# Patient Record
Sex: FEMALE
[De-identification: names, ages, dates, MRNs, and addresses within clinical notes are randomized per-mention and may not be internally consistent; named-entity substitution may affect disease eponyms.]

---

## 2020-11-04 ENCOUNTER — APPOINTMENT (OUTPATIENT)
Dept: PULMONOLOGY | Facility: CLINIC | Age: 60
End: 2020-11-04
Payer: COMMERCIAL

## 2020-11-04 VITALS
HEART RATE: 78 BPM | OXYGEN SATURATION: 98 % | WEIGHT: 228 LBS | TEMPERATURE: 97.1 F | DIASTOLIC BLOOD PRESSURE: 90 MMHG | SYSTOLIC BLOOD PRESSURE: 130 MMHG | RESPIRATION RATE: 12 BRPM | BODY MASS INDEX: 36.21 KG/M2 | HEIGHT: 66.5 IN

## 2020-11-04 DIAGNOSIS — E66.9 OBESITY, UNSPECIFIED: ICD-10-CM

## 2020-11-04 DIAGNOSIS — R06.81 APNEA, NOT ELSEWHERE CLASSIFIED: ICD-10-CM

## 2020-11-04 DIAGNOSIS — R06.83 SNORING: ICD-10-CM

## 2020-11-04 DIAGNOSIS — K21.9 GASTRO-ESOPHAGEAL REFLUX DISEASE W/OUT ESOPHAGITIS: ICD-10-CM

## 2020-11-04 DIAGNOSIS — Z78.9 OTHER SPECIFIED HEALTH STATUS: ICD-10-CM

## 2020-11-04 DIAGNOSIS — Z82.49 FAMILY HISTORY OF ISCHEMIC HEART DISEASE AND OTHER DISEASES OF THE CIRCULATORY SYSTEM: ICD-10-CM

## 2020-11-04 DIAGNOSIS — E03.9 HYPOTHYROIDISM, UNSPECIFIED: ICD-10-CM

## 2020-11-04 DIAGNOSIS — Z83.49 FAMILY HISTORY OF OTHER ENDOCRINE, NUTRITIONAL AND METABOLIC DISEASES: ICD-10-CM

## 2020-11-04 DIAGNOSIS — F17.200 NICOTINE DEPENDENCE, UNSPECIFIED, UNCOMPLICATED: ICD-10-CM

## 2020-11-04 PROCEDURE — 99204 OFFICE O/P NEW MOD 45 MIN: CPT | Mod: 25

## 2020-11-04 PROCEDURE — 99072 ADDL SUPL MATRL&STAF TM PHE: CPT

## 2020-11-04 PROCEDURE — 99406 BEHAV CHNG SMOKING 3-10 MIN: CPT

## 2020-11-04 RX ORDER — LEVOTHYROXINE SODIUM 137 UG/1
TABLET ORAL
Refills: 0 | Status: ACTIVE | COMMUNITY

## 2020-11-04 RX ORDER — HYDROCHLOROTHIAZIDE 12.5 MG/1
TABLET ORAL
Refills: 0 | Status: ACTIVE | COMMUNITY

## 2020-11-04 NOTE — ASSESSMENT
[FreeTextEntry1] : snoring, BMI 36,witnessed apnea, weight gain\par \par obstructive sleep apnea likely, will evaluate with unattended home sleep testing.  Treatment options for sleep disordered breathing were discussed.  The most rapid and successful treatment remains nasal CPAP or BilevelPAP.  Alternatives include upper airway surgery such as uvulopharyngoplasty or a dental appliance (better for milder cases).  Recently hypoglossal nerve stimulation has been used.  Positional therapy (avoidance of supine posture) can be helpful, and all patients should try to maintain a healthy weight and avoid alcohol or other sedating medications close to bedtime \par \par smoker, 1/4 pack/day, no respiratory sx.  Smoking cessation discussed   with patient.  Options include nicotine replacement with patch, gum or lozenge or use of medication to reduce cravings (Chantix). Smoking cessation programs may be useful.

## 2020-11-04 NOTE — PHYSICAL EXAM
[General Appearance - Well Developed] : well developed [General Appearance - In No Acute Distress] : no acute distress [Normal Oropharynx] : normal oropharynx [IV] : IV [Apical Impulse] : the apical impulse was normal [Heart Rate And Rhythm] : heart rate was normal and rhythm regular [Heart Sounds] : normal S1 and S2 [Heart Sounds Gallop] : no gallops [Respiration, Rhythm And Depth] : normal respiratory rhythm and effort [Exaggerated Use Of Accessory Muscles For Inspiration] : no accessory muscle use [Auscultation Breath Sounds / Voice Sounds] : lungs were clear to auscultation bilaterally [Abnormal Walk] : normal gait [Involuntary Movements] : no involuntary movements were seen [Musculoskeletal - Swelling] : no joint swelling seen [Nail Clubbing] : no clubbing of the fingernails [Cyanosis, Localized] : no localized cyanosis [Skin Color & Pigmentation] : normal skin color and pigmentation [] : no rash [Skin Lesions] : no skin lesions [No Focal Deficits] : no focal deficits [Oriented To Time, Place, And Person] : oriented to person, place, and time [Affect] : the affect was normal [Mood] : the mood was normal [FreeTextEntry1] : large neck

## 2020-11-04 NOTE — HISTORY OF PRESENT ILLNESS
-Most likely due to hemodilution, but infectious gastroenteritis should be considered.   -Follow up procalcitonin level.   -Monitor.    [FreeTextEntry1] : 11/04/2020 :  MARII EDWARD is a 60 year current smoker female with PMHx HTN, hypothyroid, who is here for evaluation of snoring and apnea. \par She states that her partner always wakes her up in middle of the night when  she experiences apnea. She reports apnea for the past year. She wakes up with dry mouth and morning HA. She often is dozing off during her  work as an  many times and feels tired during the day.  \par She has gained about 30 lb over the past 6 months. \par \par Sleep Routine:\par \par She goes to bed at 10P, sleep latency is about 5 min, she wakes up 3x/night, WASO is about 5 min, and then he is up at 5A. She does not nap . His ESS is 13/24.\par \par \par She smokes 5 cigarettes/day x 20 years.  but denies cataplexy, Denies  parasomnia, restless legs, leg movements, cataplexy or sleep paralysis. \par \par

## 2020-11-04 NOTE — REVIEW OF SYSTEMS
[EDS: ESS=____] : daytime somnolence: ESS=[unfilled] [Fatigue] : fatigue [Recent Wt Gain (___ Lbs)] : recent [unfilled] ~Ulb weight gain [Snoring] : snoring [Witnessed Apneas] : witnessed apnea [A.M. Dry Mouth] : a.m. dry mouth [Obesity] : obesity [Thyroid Disease] : thyroid disease [Diabetes] : diabetes  [A.M. Headache] : headache present upon awakening [Heartburn] : heartburn [Nocturia] : nocturia [Negative] : Psychiatric [Leg Dysesthesias] : no leg dysesthesias [Sleep Paralysis] : no sleep paralysis [Cataplexy] :  no cataplexy [Hypnogogic Hallucinations] : no hypnogogic hallucinations

## 2020-11-17 ENCOUNTER — APPOINTMENT (OUTPATIENT)
Dept: SLEEP CENTER | Facility: HOME HEALTH | Age: 60
End: 2020-11-17
Payer: COMMERCIAL

## 2020-11-17 ENCOUNTER — OUTPATIENT (OUTPATIENT)
Dept: OUTPATIENT SERVICES | Facility: HOSPITAL | Age: 60
LOS: 1 days | End: 2020-11-17
Payer: COMMERCIAL

## 2020-11-17 PROCEDURE — 95800 SLP STDY UNATTENDED: CPT | Mod: 26

## 2020-11-17 PROCEDURE — 95800 SLP STDY UNATTENDED: CPT

## 2020-11-18 DIAGNOSIS — G47.33 OBSTRUCTIVE SLEEP APNEA (ADULT) (PEDIATRIC): ICD-10-CM

## 2020-11-29 ENCOUNTER — FORM ENCOUNTER (OUTPATIENT)
Age: 60
End: 2020-11-29

## 2020-11-30 ENCOUNTER — APPOINTMENT (OUTPATIENT)
Dept: PULMONOLOGY | Facility: CLINIC | Age: 60
End: 2020-11-30
Payer: COMMERCIAL

## 2020-11-30 VITALS
HEART RATE: 50 BPM | HEIGHT: 66.5 IN | OXYGEN SATURATION: 99 % | WEIGHT: 229 LBS | DIASTOLIC BLOOD PRESSURE: 85 MMHG | SYSTOLIC BLOOD PRESSURE: 146 MMHG | BODY MASS INDEX: 36.37 KG/M2 | TEMPERATURE: 97.7 F

## 2020-11-30 PROCEDURE — 99072 ADDL SUPL MATRL&STAF TM PHE: CPT

## 2020-11-30 PROCEDURE — 99214 OFFICE O/P EST MOD 30 MIN: CPT

## 2020-11-30 NOTE — ASSESSMENT
[FreeTextEntry1] : obstructive sleep apnea, moderate \par \par Treatment options for sleep disordered breathing were discussed.  The most rapid and successful treatment remains nasal CPAP or BilevelPAP.  Alternatives include upper airway surgery such as uvulopharyngoplasty or a dental appliance (better for milder cases).  Recently hypoglossal nerve stimulation has been used.  Positional therapy (avoidance of supine posture) can be helpful, and all patients should try to maintain a healthy weight and avoid alcohol or other sedating medications close to bedtime \par \par Treatment will be ordered with autotitrating CPAP, which will be downloaded after a few weeks of use to check compliance and optimize pressure based on efficacy.  If not comfortable with this treatment, polysomnography with CPAP titration may be needed.

## 2020-11-30 NOTE — PROCEDURE
[FreeTextEntry1] : unattended home sleep testing done 11/17/2020 reviewed w patient:  moderate obstructive sleep apnea  with Apnea Hypopnea Index = 25, no oxygen desaturations   below 89%

## 2020-11-30 NOTE — HISTORY OF PRESENT ILLNESS
[FreeTextEntry1] : 11/30/2020 :  MARII EDWARD is a 60 year female who is here after having unattended home sleep testing.  Snoring, witnessed apnea, and excessive daytime somnolence with [place] sleepiness scale (out of 24 points) = 13.\par \par There have been no significant medical events and no new medications since the patient was last seen.

## 2021-06-29 ENCOUNTER — APPOINTMENT (OUTPATIENT)
Dept: SURGERY | Facility: CLINIC | Age: 61
End: 2021-06-29
Payer: COMMERCIAL

## 2021-06-29 VITALS — WEIGHT: 230 LBS | BODY MASS INDEX: 36.53 KG/M2 | HEIGHT: 66.6 IN

## 2021-06-29 DIAGNOSIS — Z00.00 ENCOUNTER FOR GENERAL ADULT MEDICAL EXAMINATION W/OUT ABNORMAL FINDINGS: ICD-10-CM

## 2021-06-29 DIAGNOSIS — I10 ESSENTIAL (PRIMARY) HYPERTENSION: ICD-10-CM

## 2021-06-29 DIAGNOSIS — G47.33 OBSTRUCTIVE SLEEP APNEA (ADULT) (PEDIATRIC): ICD-10-CM

## 2021-06-29 PROCEDURE — 99204 OFFICE O/P NEW MOD 45 MIN: CPT | Mod: 95

## 2021-06-29 NOTE — HISTORY OF PRESENT ILLNESS
[Home] : at home, [unfilled] , at the time of the visit. [Medical Office: ([facility])___] : at the medical office located in  [Verbal consent obtained from patient] : the patient, [unfilled] [de-identified] : Pt is a 62 y/o F with PMH of obesity (BMI 36), HTN on a diuretic and mild sleep apnea, PSxH of remote partial hysterectomy, presenting today via telemedicine video call for initial consult for weight loss and consideration for bariatric surgery. She was referred by her PCP Dr. Giordano. She weighs 230lbs today and notes this is the highest she has ever weighed. she has tried to lose weight in the past through diet programs with no success. Patient admits that she has intermittent constipation, occasional nausea with certain food, and acid reflux with certain food. She denies abdominal pain, vomiting, diarrhea. Pt reports a negative colonoscopy a few years ago with f/u in ten years. Patient notes that she lives with her , she has children and one lives nearby, and that her family and friends are supportive of weight loss surgery for her. She states that she is interested i VSG

## 2021-06-29 NOTE — ADDENDUM
[FreeTextEntry1] : All medical record entries made by the Scribe were at my, Dr. Ermias Liriano, direction and personally dictated by me on 06/29/2021. I have reviewed the chart and agree that the record accurately reflects my personal performance of the history, physical exam, assessment and plan. I have also personally directed, reviewed, and agreed with the chart.

## 2021-06-29 NOTE — ASSESSMENT
[FreeTextEntry1] : Pt is a 62 y/o F with PMH of obesity (BMI 36), HTN on a diuretic and mild sleep apnea, PSxH of remote partial hysterectomy and no other abdominal surgeries, presenting today via telemedicine video call for initial consult for weight loss and consideration for bariatric surgery. She weighs 230lbs today and notes this is the highest she has ever weighed. Patient has unsuccessfully attempted to lose weight by dieting several times. Patient admits that she has intermittent constipation, occasional nausea with certain food, and acid reflux with certain food. She denies abdominal pain, vomiting, diarrhea. Pt reports that she has an up to date negative colonoscopy. Patient notes that she lives with her  and that her family and friends are supportive of weight loss surgery for her. She states that she is interested VSG. \par Explained to patient the bariatric workup in detail and answered all questions from the patient. Informed her of six monthly weigh ins and the various means in which she can obtain those. Also informed her of the stipulations around monthly weigh ins. This would put possible surgery date in December 2021. Patient informed that she may not need a repeat sleep study if Dr. Malcolm says that the one on file from November 2020 is acceptable, but she may need to repeat the study. Informed patient that she will need PFTs, cardiac workup, letter of support from PCP. Her next steps is to submit her July weigh in. She will be receiving an email outlining everything we discussed today. Patient is to f/u in one month.\par \par Pt need s to meet Dr. Liriano

## 2021-06-29 NOTE — PLAN
[FreeTextEntry1] : Bariatric workup including sleep study, PFTs, letter of support form PCP, cardiac clearance, and six monthly weigh in.\par F/u in one month for monthly weigh in.\par Pt to meet with  at final visit\par

## 2023-09-06 ENCOUNTER — APPOINTMENT (OUTPATIENT)
Dept: NEUROLOGY | Facility: CLINIC | Age: 63
End: 2023-09-06